# Patient Record
Sex: MALE | Race: WHITE | NOT HISPANIC OR LATINO | URBAN - METROPOLITAN AREA
[De-identification: names, ages, dates, MRNs, and addresses within clinical notes are randomized per-mention and may not be internally consistent; named-entity substitution may affect disease eponyms.]

---

## 2014-12-28 RX ORDER — DOCUSATE SODIUM 100 MG
1 CAPSULE ORAL
Qty: 0 | Refills: 0 | COMMUNITY
Start: 2014-12-28

## 2014-12-28 RX ORDER — ASPIRIN/CALCIUM CARB/MAGNESIUM 324 MG
1 TABLET ORAL
Qty: 0 | Refills: 0 | COMMUNITY
Start: 2014-12-28

## 2017-02-23 ENCOUNTER — OUTPATIENT (OUTPATIENT)
Dept: OUTPATIENT SERVICES | Facility: HOSPITAL | Age: 77
LOS: 1 days | End: 2017-02-23
Payer: MEDICARE

## 2017-02-23 VITALS
HEART RATE: 65 BPM | TEMPERATURE: 98 F | RESPIRATION RATE: 18 BRPM | OXYGEN SATURATION: 100 % | HEIGHT: 69.5 IN | WEIGHT: 175.05 LBS | DIASTOLIC BLOOD PRESSURE: 81 MMHG | SYSTOLIC BLOOD PRESSURE: 137 MMHG

## 2017-02-23 VITALS
WEIGHT: 175.27 LBS | HEART RATE: 70 BPM | HEIGHT: 69 IN | DIASTOLIC BLOOD PRESSURE: 81 MMHG | SYSTOLIC BLOOD PRESSURE: 137 MMHG | RESPIRATION RATE: 16 BRPM | OXYGEN SATURATION: 100 % | TEMPERATURE: 98 F

## 2017-02-23 DIAGNOSIS — N43.3 HYDROCELE, UNSPECIFIED: Chronic | ICD-10-CM

## 2017-02-23 DIAGNOSIS — Z01.810 ENCOUNTER FOR PREPROCEDURAL CARDIOVASCULAR EXAMINATION: ICD-10-CM

## 2017-02-23 DIAGNOSIS — H40.9 UNSPECIFIED GLAUCOMA: ICD-10-CM

## 2017-02-23 DIAGNOSIS — Z98.89 OTHER SPECIFIED POSTPROCEDURAL STATES: Chronic | ICD-10-CM

## 2017-02-23 DIAGNOSIS — I10 ESSENTIAL (PRIMARY) HYPERTENSION: ICD-10-CM

## 2017-02-23 DIAGNOSIS — K21.9 GASTRO-ESOPHAGEAL REFLUX DISEASE WITHOUT ESOPHAGITIS: ICD-10-CM

## 2017-02-23 DIAGNOSIS — E78.1 PURE HYPERGLYCERIDEMIA: ICD-10-CM

## 2017-02-23 LAB
ANION GAP SERPL CALC-SCNC: 9 MMOL/L — SIGNIFICANT CHANGE UP (ref 5–17)
BUN SERPL-MCNC: 18 MG/DL — SIGNIFICANT CHANGE UP (ref 8–20)
CALCIUM SERPL-MCNC: 9.4 MG/DL — SIGNIFICANT CHANGE UP (ref 8.6–10.2)
CHLORIDE SERPL-SCNC: 104 MMOL/L — SIGNIFICANT CHANGE UP (ref 98–107)
CO2 SERPL-SCNC: 26 MMOL/L — SIGNIFICANT CHANGE UP (ref 22–29)
CREAT SERPL-MCNC: 0.95 MG/DL — SIGNIFICANT CHANGE UP (ref 0.5–1.3)
GLUCOSE SERPL-MCNC: 78 MG/DL — SIGNIFICANT CHANGE UP (ref 70–115)
HCT VFR BLD CALC: 42.1 % — SIGNIFICANT CHANGE UP (ref 42–52)
HGB BLD-MCNC: 14.7 G/DL — SIGNIFICANT CHANGE UP (ref 14–18)
INR BLD: 1.08 RATIO — SIGNIFICANT CHANGE UP (ref 0.88–1.16)
MCHC RBC-ENTMCNC: 30.7 PG — SIGNIFICANT CHANGE UP (ref 27–31)
MCHC RBC-ENTMCNC: 34.9 G/DL — SIGNIFICANT CHANGE UP (ref 32–36)
MCV RBC AUTO: 87.9 FL — SIGNIFICANT CHANGE UP (ref 80–94)
PLATELET # BLD AUTO: 181 K/UL — SIGNIFICANT CHANGE UP (ref 150–400)
POTASSIUM SERPL-MCNC: 4.5 MMOL/L — SIGNIFICANT CHANGE UP (ref 3.5–5.3)
POTASSIUM SERPL-SCNC: 4.5 MMOL/L — SIGNIFICANT CHANGE UP (ref 3.5–5.3)
PROTHROM AB SERPL-ACNC: 11.9 SEC — SIGNIFICANT CHANGE UP (ref 10–13.1)
RBC # BLD: 4.79 M/UL — SIGNIFICANT CHANGE UP (ref 4.6–6.2)
RBC # FLD: 12.8 % — SIGNIFICANT CHANGE UP (ref 11–15.6)
SODIUM SERPL-SCNC: 139 MMOL/L — SIGNIFICANT CHANGE UP (ref 135–145)
WBC # BLD: 5.7 K/UL — SIGNIFICANT CHANGE UP (ref 4.8–10.8)
WBC # FLD AUTO: 5.7 K/UL — SIGNIFICANT CHANGE UP (ref 4.8–10.8)

## 2017-02-23 PROCEDURE — 93005 ELECTROCARDIOGRAM TRACING: CPT

## 2017-02-23 PROCEDURE — 80048 BASIC METABOLIC PNL TOTAL CA: CPT

## 2017-02-23 PROCEDURE — 93010 ELECTROCARDIOGRAM REPORT: CPT

## 2017-02-23 PROCEDURE — 36415 COLL VENOUS BLD VENIPUNCTURE: CPT

## 2017-02-23 PROCEDURE — G0463: CPT

## 2017-02-23 PROCEDURE — 85610 PROTHROMBIN TIME: CPT

## 2017-02-23 PROCEDURE — 85027 COMPLETE CBC AUTOMATED: CPT

## 2017-02-23 NOTE — H&P PST ADULT - NSANTHOSAYNRD_GEN_A_CORE
No. BISI screening performed.  STOP BANG Legend: 0-2 = LOW Risk; 3-4 = INTERMEDIATE Risk; 5-8 = HIGH Risk

## 2017-02-23 NOTE — H&P PST ADULT - HISTORY OF PRESENT ILLNESS
This 76 year old presents to pst for cardiac cath with complaints of Jaw pain, radiating to left ear and left temporal area which began about 1 month, pain ntial 9/10 now 3/10. This 76 year old presents to pst for cardiac cath with complaints of Jaw pain, radiating to left ear and left temporal area which began about 1 month, pain initial 9/10 now 3/10.

## 2017-02-23 NOTE — H&P PST ADULT - FAMILY HISTORY
Child  Still living? Yes, Estimated age: Age Unknown  Family history of bipolar disorder, Age at diagnosis: Age Unknown

## 2017-02-24 DIAGNOSIS — I25.10 ATHEROSCLEROTIC HEART DISEASE OF NATIVE CORONARY ARTERY WITHOUT ANGINA PECTORIS: ICD-10-CM

## 2017-02-24 DIAGNOSIS — Z01.810 ENCOUNTER FOR PREPROCEDURAL CARDIOVASCULAR EXAMINATION: ICD-10-CM

## 2017-02-27 ENCOUNTER — INPATIENT (INPATIENT)
Facility: HOSPITAL | Age: 77
LOS: 0 days | Discharge: HOME | DRG: 247 | End: 2017-02-28
Attending: INTERNAL MEDICINE | Admitting: INTERNAL MEDICINE
Payer: COMMERCIAL

## 2017-02-27 VITALS
SYSTOLIC BLOOD PRESSURE: 127 MMHG | TEMPERATURE: 98 F | DIASTOLIC BLOOD PRESSURE: 76 MMHG | OXYGEN SATURATION: 100 % | RESPIRATION RATE: 18 BRPM | HEART RATE: 65 BPM

## 2017-02-27 DIAGNOSIS — I25.10 ATHEROSCLEROTIC HEART DISEASE OF NATIVE CORONARY ARTERY WITHOUT ANGINA PECTORIS: ICD-10-CM

## 2017-02-27 DIAGNOSIS — Z98.89 OTHER SPECIFIED POSTPROCEDURAL STATES: Chronic | ICD-10-CM

## 2017-02-27 DIAGNOSIS — N43.3 HYDROCELE, UNSPECIFIED: Chronic | ICD-10-CM

## 2017-02-27 DIAGNOSIS — Z01.810 ENCOUNTER FOR PREPROCEDURAL CARDIOVASCULAR EXAMINATION: ICD-10-CM

## 2017-02-27 PROCEDURE — 93010 ELECTROCARDIOGRAM REPORT: CPT

## 2017-02-27 RX ORDER — LOSARTAN POTASSIUM 100 MG/1
1 TABLET, FILM COATED ORAL
Qty: 0 | Refills: 0 | COMMUNITY

## 2017-02-27 RX ORDER — LOSARTAN POTASSIUM 100 MG/1
25 TABLET, FILM COATED ORAL DAILY
Qty: 0 | Refills: 0 | Status: DISCONTINUED | OUTPATIENT
Start: 2017-02-27 | End: 2017-02-28

## 2017-02-27 RX ORDER — TICAGRELOR 90 MG/1
90 TABLET ORAL
Qty: 0 | Refills: 0 | Status: DISCONTINUED | OUTPATIENT
Start: 2017-02-27 | End: 2017-02-28

## 2017-02-27 RX ORDER — NIZATIDINE 150 MG/1
1 CAPSULE ORAL
Qty: 0 | Refills: 0 | COMMUNITY

## 2017-02-27 RX ORDER — ASPIRIN/CALCIUM CARB/MAGNESIUM 324 MG
81 TABLET ORAL DAILY
Qty: 0 | Refills: 0 | Status: DISCONTINUED | OUTPATIENT
Start: 2017-02-27 | End: 2017-02-28

## 2017-02-27 RX ORDER — DOCUSATE SODIUM 100 MG
100 CAPSULE ORAL
Qty: 0 | Refills: 0 | Status: DISCONTINUED | OUTPATIENT
Start: 2017-02-27 | End: 2017-02-28

## 2017-02-27 RX ORDER — ASPIRIN/CALCIUM CARB/MAGNESIUM 324 MG
325 TABLET ORAL ONCE
Qty: 0 | Refills: 0 | Status: COMPLETED | OUTPATIENT
Start: 2017-02-27 | End: 2017-02-27

## 2017-02-27 RX ORDER — SIMVASTATIN 20 MG/1
40 TABLET, FILM COATED ORAL AT BEDTIME
Qty: 0 | Refills: 0 | Status: DISCONTINUED | OUTPATIENT
Start: 2017-02-27 | End: 2017-02-28

## 2017-02-27 RX ORDER — FAMOTIDINE 10 MG/ML
20 INJECTION INTRAVENOUS
Qty: 0 | Refills: 0 | Status: DISCONTINUED | OUTPATIENT
Start: 2017-02-27 | End: 2017-02-28

## 2017-02-27 RX ADMIN — Medication 325 MILLIGRAM(S): at 16:17

## 2017-02-27 RX ADMIN — LOSARTAN POTASSIUM 25 MILLIGRAM(S): 100 TABLET, FILM COATED ORAL at 22:19

## 2017-02-27 RX ADMIN — SIMVASTATIN 40 MILLIGRAM(S): 20 TABLET, FILM COATED ORAL at 22:19

## 2017-02-27 RX ADMIN — FAMOTIDINE 20 MILLIGRAM(S): 10 INJECTION INTRAVENOUS at 22:19

## 2017-02-27 NOTE — PROGRESS NOTE ADULT - SUBJECTIVE AND OBJECTIVE BOX
Subjective:  76y  Male who had a left heart catheterization which showed:       LM: 90% stenosis treated with a Resolute ALMA ROSA       LAD: 80% proximal stenosis fed by an atretic LIMA with slow flow, treated with a proximal and 2 mid Resolute DS's       LCX: 90% proximal stenosis filling via a SVG       RCA: Distal occlusion filling via a SVG       LIMA to the LAD: Small caliber with slow flow       Sequential SVG to the D2 and apical LAD: Patent       SVG to the LCX: Patent       SVG to the RCA: Patent    PAST MEDICAL & SURGICAL HISTORY:  Essential hypertension  Hyperlipemia  Glaucoma  GERD (gastroesophageal reflux disease)  History of tonsillectomy  Left hydrocele  History of facial surgery  H/O shoulder surgery    FAMILY HISTORY:  Family history of bipolar disorder (Child)    MEDICATIONS  (STANDING):  aspirin enteric coated 81milliGRAM(s) Oral daily  losartan 25milliGRAM(s) Oral daily  simvastatin 40milliGRAM(s) Oral at bedtime  famotidine    Tablet 20milliGRAM(s) Oral two times a day  docusate sodium 100milliGRAM(s) Oral two times a day  ticagrelor 90milliGRAM(s) Oral two times a day      General: No fatigue, no fevers/chills  Respiratory: No dyspnea, no cough, no wheeze  CV: No chest pain, no palpitations  Abd: No nausea  Neuro: No headache, no dizziness  Maddy (Hives)      Objective:  Vital Signs Last 24 Hrs  T(C): 36.4, Max: 36.4 (02-27 @ 15:59)  T(F): 97.6, Max: 97.6 (02-27 @ 15:59)  HR: 65 (65 - 65)  BP: 127/76 (127/76 - 127/76)  RR: 18 (18 - 18)  SpO2: 100% (100% - 100%)    CM: SR  Neuro: A&OX3, CN 2-12 intact  HEENT: NC, AT  Lungs: CTA B/L  CV: S1, S2, no murmur, RRR  Abd: Soft  Right Groin: RFA sheath, no bleeding, no hematoma  Extremity: + distal pulses

## 2017-02-27 NOTE — PROGRESS NOTE ADULT - ASSESSMENT
76y Male  Procedure: Left heart catheterization and PCI with ALMA ROSA of the LM, pLAD, mLAD  Pre-op diagnosis: Unstable angina  Post-op diagnosis: CAD of native arteries of the native heart with unstable angina    1. Remove right femoral artery sheath at: 9:00PM    2. Bedrest for: 4 hours post sheath pull    3. Admit to: [address] [place]    4. Follow up as an outpatient with Dr. Sifuentes    5. DAPT: Brilinta 90mg twice daily and Aspirin 81mg daily    6. Statin: Zocor 40mg Q HS    7. CBC, BMP, Mg, EKG in AM    8. Continue current medications

## 2017-02-27 NOTE — PROGRESS NOTE ADULT - SUBJECTIVE AND OBJECTIVE BOX
PA note    Right groin sheath DC'd pressure held x 20 minutes with good hemostasis no hematoma or tenderness. VVS  through out. Sheath pull was done in stearal fashion and dressing applied.   Further groin care per routine.

## 2017-02-27 NOTE — PROGRESS NOTE ADULT - SUBJECTIVE AND OBJECTIVE BOX
Pre Cath Note    76y Male     Planned Procedure: [facility]    Pertinent Prior Medications:        Antiplatelet: N/A       Aspirin: 81mg daily       Statin: Zocor 40mg Q HS       Other: Losartan 25mg daily    Pre-Procedural Orders: Aspirin 325mg    ASA: 3  Mallampati: 3  CCS Class:     HPI: This 76 year old presents for cardiac catheterization with complaints of jaw pain, radiating to left ear and left temporal area which began about 1 month ago. The pain is similar to the pain he felt prior to his CABG.  He had a cardiac PET scan which showed a small area of mild apical to mid anterolateral ischemia as well as an old area of inferior, basal inferoseptal, and basal inferolateral wall scar with reina-infarct ischemia.          Nuclear Stress Test:        Protocol: Cardiac PET Scan       Exercised for: N/A       Symptoms: None       Stress EKG: Nondiagnostic       DTS: N/A       Nuclear Imaging: A small area of mild apical to mid anterolateral ischemia, as well as an old area of inferior, basal inferoseptal, and basal inferolateral wall scar with reina-infarct ischemia.        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

## 2017-02-28 ENCOUNTER — TRANSCRIPTION ENCOUNTER (OUTPATIENT)
Age: 77
End: 2017-02-28

## 2017-02-28 VITALS
OXYGEN SATURATION: 98 % | HEART RATE: 72 BPM | DIASTOLIC BLOOD PRESSURE: 68 MMHG | SYSTOLIC BLOOD PRESSURE: 101 MMHG | TEMPERATURE: 98 F | RESPIRATION RATE: 18 BRPM

## 2017-02-28 LAB
ANION GAP SERPL CALC-SCNC: 13 MMOL/L — SIGNIFICANT CHANGE UP (ref 5–17)
BASOPHILS # BLD AUTO: 0 K/UL — SIGNIFICANT CHANGE UP (ref 0–0.2)
BASOPHILS NFR BLD AUTO: 0.1 % — SIGNIFICANT CHANGE UP (ref 0–2)
BUN SERPL-MCNC: 18 MG/DL — SIGNIFICANT CHANGE UP (ref 8–20)
CALCIUM SERPL-MCNC: 8.8 MG/DL — SIGNIFICANT CHANGE UP (ref 8.6–10.2)
CHLORIDE SERPL-SCNC: 98 MMOL/L — SIGNIFICANT CHANGE UP (ref 98–107)
CHOLEST SERPL-MCNC: 169 MG/DL — SIGNIFICANT CHANGE UP (ref 110–199)
CO2 SERPL-SCNC: 26 MMOL/L — SIGNIFICANT CHANGE UP (ref 22–29)
CREAT SERPL-MCNC: 0.99 MG/DL — SIGNIFICANT CHANGE UP (ref 0.5–1.3)
EOSINOPHIL # BLD AUTO: 0 K/UL — SIGNIFICANT CHANGE UP (ref 0–0.5)
EOSINOPHIL NFR BLD AUTO: 0.6 % — SIGNIFICANT CHANGE UP (ref 0–5)
GLUCOSE SERPL-MCNC: 114 MG/DL — SIGNIFICANT CHANGE UP (ref 70–115)
HCT VFR BLD CALC: 44.7 % — SIGNIFICANT CHANGE UP (ref 42–52)
HDLC SERPL-MCNC: 45 MG/DL — LOW
HGB BLD-MCNC: 15.8 G/DL — SIGNIFICANT CHANGE UP (ref 14–18)
LIPID PNL WITH DIRECT LDL SERPL: 100 MG/DL — SIGNIFICANT CHANGE UP
LYMPHOCYTES # BLD AUTO: 1.6 K/UL — SIGNIFICANT CHANGE UP (ref 1–4.8)
LYMPHOCYTES # BLD AUTO: 19.8 % — LOW (ref 20–55)
MAGNESIUM SERPL-MCNC: 2.2 MG/DL — SIGNIFICANT CHANGE UP (ref 1.8–2.5)
MCHC RBC-ENTMCNC: 30.8 PG — SIGNIFICANT CHANGE UP (ref 27–31)
MCHC RBC-ENTMCNC: 35.3 G/DL — SIGNIFICANT CHANGE UP (ref 32–36)
MCV RBC AUTO: 87.1 FL — SIGNIFICANT CHANGE UP (ref 80–94)
MONOCYTES # BLD AUTO: 0.6 K/UL — SIGNIFICANT CHANGE UP (ref 0–0.8)
MONOCYTES NFR BLD AUTO: 7.2 % — SIGNIFICANT CHANGE UP (ref 3–10)
NEUTROPHILS # BLD AUTO: 5.8 K/UL — SIGNIFICANT CHANGE UP (ref 1.8–8)
NEUTROPHILS NFR BLD AUTO: 71.9 % — SIGNIFICANT CHANGE UP (ref 37–73)
PLATELET # BLD AUTO: 161 K/UL — SIGNIFICANT CHANGE UP (ref 150–400)
POTASSIUM SERPL-MCNC: 4 MMOL/L — SIGNIFICANT CHANGE UP (ref 3.5–5.3)
POTASSIUM SERPL-SCNC: 4 MMOL/L — SIGNIFICANT CHANGE UP (ref 3.5–5.3)
RBC # BLD: 5.13 M/UL — SIGNIFICANT CHANGE UP (ref 4.6–6.2)
RBC # FLD: 12.7 % — SIGNIFICANT CHANGE UP (ref 11–15.6)
SODIUM SERPL-SCNC: 137 MMOL/L — SIGNIFICANT CHANGE UP (ref 135–145)
TOTAL CHOLESTEROL/HDL RATIO MEASUREMENT: 4 RATIO — SIGNIFICANT CHANGE UP (ref 3.4–9.6)
TRIGL SERPL-MCNC: 121 MG/DL — SIGNIFICANT CHANGE UP (ref 10–200)
WBC # BLD: 8.1 K/UL — SIGNIFICANT CHANGE UP (ref 4.8–10.8)
WBC # FLD AUTO: 8.1 K/UL — SIGNIFICANT CHANGE UP (ref 4.8–10.8)

## 2017-02-28 PROCEDURE — 83735 ASSAY OF MAGNESIUM: CPT

## 2017-02-28 PROCEDURE — C1725: CPT

## 2017-02-28 PROCEDURE — C1769: CPT

## 2017-02-28 PROCEDURE — C1874: CPT

## 2017-02-28 PROCEDURE — 93455 CORONARY ART/GRFT ANGIO S&I: CPT | Mod: XU

## 2017-02-28 PROCEDURE — 36415 COLL VENOUS BLD VENIPUNCTURE: CPT

## 2017-02-28 PROCEDURE — C1887: CPT

## 2017-02-28 PROCEDURE — C1894: CPT

## 2017-02-28 PROCEDURE — C9600: CPT | Mod: LD

## 2017-02-28 PROCEDURE — 80048 BASIC METABOLIC PNL TOTAL CA: CPT

## 2017-02-28 PROCEDURE — 80061 LIPID PANEL: CPT

## 2017-02-28 PROCEDURE — 85027 COMPLETE CBC AUTOMATED: CPT

## 2017-02-28 PROCEDURE — 93010 ELECTROCARDIOGRAM REPORT: CPT

## 2017-02-28 PROCEDURE — C9601: CPT

## 2017-02-28 PROCEDURE — 93005 ELECTROCARDIOGRAM TRACING: CPT

## 2017-02-28 RX ORDER — METOPROLOL TARTRATE 50 MG
1 TABLET ORAL
Qty: 30 | Refills: 0 | OUTPATIENT
Start: 2017-02-28 | End: 2017-03-30

## 2017-02-28 RX ORDER — METOPROLOL TARTRATE 50 MG
50 TABLET ORAL DAILY
Qty: 0 | Refills: 0 | Status: DISCONTINUED | OUTPATIENT
Start: 2017-02-28 | End: 2017-02-28

## 2017-02-28 RX ORDER — TICAGRELOR 90 MG/1
1 TABLET ORAL
Qty: 180 | Refills: 3 | OUTPATIENT
Start: 2017-02-28 | End: 2018-02-22

## 2017-02-28 RX ADMIN — TICAGRELOR 90 MILLIGRAM(S): 90 TABLET ORAL at 06:04

## 2017-02-28 RX ADMIN — FAMOTIDINE 20 MILLIGRAM(S): 10 INJECTION INTRAVENOUS at 06:05

## 2017-02-28 RX ADMIN — Medication 50 MILLIGRAM(S): at 07:15

## 2017-02-28 NOTE — DISCHARGE NOTE ADULT - PLAN OF CARE
No chest pain Follow up with your doctor as instructed.  Continue medications as instructed.  Follow prescribed diet. No VT

## 2017-02-28 NOTE — DISCHARGE NOTE ADULT - MEDICATION SUMMARY - MEDICATIONS TO TAKE
I will START or STAY ON the medications listed below when I get home from the hospital:    aspirin 81 mg oral tablet  -- 1 tab(s) by mouth once a day  -- Indication: For Coronary artery disease involving native coronary artery of native heart with unstable angina pector    losartan 25 mg oral tablet  -- 1 tab(s) by mouth once a day  -- Indication: For Hypertension    simvastatin  -- 40 milligram(s) by mouth once a day (at bedtime)  -- Indication: For Hyperlipidemia    ticagrelor 90 mg oral tablet  -- 1 tab(s) by mouth 2 times a day  -- Indication: For Coronary artery disease involving native coronary artery of native heart with unstable angina pector    metoprolol succinate 50 mg oral tablet, extended release  -- 1 tab(s) by mouth once a day  -- Indication: For Hypertension    nizatidine 150 mg oral capsule  -- 1 cap(s) by mouth 2 times a day  -- Indication: For GERD (gastroesophageal reflux disease)    docusate sodium 100 mg oral capsule  -- 1 cap(s) by mouth 2 times a day  -- Indication: For Constipation    brimonidine-timolol 0.2%-0.5% ophthalmic solution  -- 1 drop(s) to each affected eye 2 times a day  -- Indication: For Glaucoma

## 2017-02-28 NOTE — PROGRESS NOTE ADULT - SUBJECTIVE AND OBJECTIVE BOX
Subjective:  76y  Male who had a left heart catheterization which showed:       LM: 90% stenosis treated with a Resolute ALMA ROSA       LAD: 80% proximal stenosis fed by an atretic LIMA with slow flow, treated with a proximal and 2 mid Resolute DS's       LCX: 90% proximal stenosis filling via a SVG       RCA: Distal occlusion filling via a SVG       LIMA to the LAD: Small caliber with slow flow       Sequential SVG to the D2 and apical LAD: Patent       SVG to the LCX: Patent       SVG to the RCA: Patent      PAST MEDICAL & SURGICAL HISTORY:  Essential hypertension  Hyperlipemia  Glaucoma  GERD (gastroesophageal reflux disease)  History of tonsillectomy  Left hydrocele  History of facial surgery  H/O shoulder surgery    FAMILY HISTORY:  Family history of bipolar disorder (Child)    MEDICATIONS  (STANDING):  aspirin enteric coated 81milliGRAM(s) Oral daily  losartan 25milliGRAM(s) Oral daily  simvastatin 40milliGRAM(s) Oral at bedtime  famotidine    Tablet 20milliGRAM(s) Oral two times a day  docusate sodium 100milliGRAM(s) Oral two times a day  ticagrelor 90milliGRAM(s) Oral two times a day  metoprolol succinate ER 50milliGRAM(s) Oral daily        General: No fatigue, no fevers/chills  Respiratory: No dyspnea, no cough, no wheeze  CV: No chest pain, no palpitations  Abd: No nausea  Neuro: No headache, no dizziness  Seldane-D (Hives)      Objective:  Vital Signs Last 24 Hrs  T(C): 36.4, Max: 36.4 (02-27 @ 15:59)  T(F): 97.6, Max: 97.6 (02-27 @ 15:59)  HR: 73 (60 - 85)  BP: 149/76 (120/72 - 158/71)  RR: 16 (16 - 18)  SpO2: 98% (94% - 100%)    CM: Atrial sensed, ventricular paced, occasional to frequent PVC's and 2 episodes of monomorphic VT (> 10 beats each)  Neuro: A&OX3, CN 2-12 intact  HEENT: NC, AT  Lungs: CTA B/L  CV: S1, S2, no murmur, RRR  Abd: Soft  Right Groin: Soft, no bleeding, no hematoma  Extremity: + distal pulses  EKG: Atrial sensed, ventricular paced                        15.8   8.1   )-----------( 161      ( 28 Feb 2017 02:21 )             44.7     28 Feb 2017 02:21    137    |  98     |  18.0   ----------------------------<  114    4.0     |  26.0   |  0.99     Ca    8.8        28 Feb 2017 02:21  Mg     2.2       28 Feb 2017 02:21

## 2017-02-28 NOTE — DISCHARGE NOTE ADULT - CARE PLAN
Principal Discharge DX:	Coronary artery disease involving native coronary artery of native heart with unstable angina pector  Goal:	No chest pain  Instructions for follow-up, activity and diet:	Follow up with your doctor as instructed.  Continue medications as instructed.  Follow prescribed diet.  Secondary Diagnosis:	Ventricular tachycardia, monomorphic  Goal:	No VT  Instructions for follow-up, activity and diet:	Follow up with your doctor as instructed.  Continue medications as instructed.  Follow prescribed diet.

## 2017-02-28 NOTE — DISCHARGE NOTE ADULT - PRINCIPAL DIAGNOSIS
Coronary artery disease involving native coronary artery of native heart with unstable angina pector

## 2017-02-28 NOTE — DISCHARGE NOTE ADULT - PATIENT PORTAL LINK FT
“You can access the FollowHealth Patient Portal, offered by [facility], by registering with the following website: [email or web address]”

## 2017-02-28 NOTE — DISCHARGE NOTE ADULT - NS AS ACTIVITY OBS
Walking-Indoors allowed/Showering allowed/Stairs allowed/Do not drive or operate machinery/Walking-Outdoors allowed/No Heavy lifting/straining

## 2017-02-28 NOTE — PROGRESS NOTE ADULT - ASSESSMENT
Procedure: Left heart catheterization and PCI with ALMA ROSA of the LM, pLAD, and mLAD  Discharge Diagnosis: CAD of native LM and LAD of native heart with unstable angina, monomorphic VT    1. Discharge home today    2. Follow up as an outpatient with: Dr. Sifuentes    3. Post procedure teaching done including importance of medication adherence and access site care and monitoring.    4. DAPT: Brilinta 90mg BID and aspirin 81mg daily    5. Statin: Zocor 40mg Q HS    6. Beta Blocker: Toprol 50mg daily    7. ACEI/ARB: Losartan 25mg daily

## 2017-02-28 NOTE — DISCHARGE NOTE ADULT - ADDITIONAL INSTRUCTIONS
No heavy lifting, driving, sex, tub baths, swimming, or any activity that submerges the lower half of the body in water for 48 hours.  Limited walking and stairs for 48 hours.    Change the bandaid after 24 hours and every 24 hours after that.  Keep the puncture site dry and covered with a bandaid until a scab forms.    Observe the site frequently.  If bleeding or a large lump (the size of a golf ball or bigger) occurs lie flat, apply continuous direct pressure just above the puncture site for at least 10 minutes, and notify your physician immediately.  If the bleeding cannot be controlled, call 911 immediately for assistance.  Notify your physician of pain, swelling or any drainage.    Notify your physician immediately if coldness, numbness, discoloration or pain in your foot occurs.  Contact your physician before discontinuing any medications.  The American College of Cardiology provides a website which provides information and tools to help manage your disease.  It can be found at https://www.cardiosmart.org

## 2020-12-24 ENCOUNTER — TRANSCRIPTION ENCOUNTER (OUTPATIENT)
Age: 80
End: 2020-12-24

## 2022-05-30 ENCOUNTER — NON-APPOINTMENT (OUTPATIENT)
Age: 82
End: 2022-05-30

## 2022-05-31 ENCOUNTER — TRANSCRIPTION ENCOUNTER (OUTPATIENT)
Age: 82
End: 2022-05-31

## 2022-05-31 ENCOUNTER — EMERGENCY (EMERGENCY)
Facility: HOSPITAL | Age: 82
LOS: 1 days | Discharge: HOME | End: 2022-05-31
Attending: EMERGENCY MEDICINE
Payer: MEDICARE

## 2022-05-31 VITALS — DIASTOLIC BLOOD PRESSURE: 60 MMHG | SYSTOLIC BLOOD PRESSURE: 110 MMHG | HEART RATE: 78 BPM

## 2022-05-31 VITALS
TEMPERATURE: 98 F | OXYGEN SATURATION: 96 % | HEART RATE: 77 BPM | SYSTOLIC BLOOD PRESSURE: 96 MMHG | DIASTOLIC BLOOD PRESSURE: 60 MMHG | WEIGHT: 164.91 LBS | RESPIRATION RATE: 18 BRPM | HEIGHT: 69.5 IN

## 2022-05-31 DIAGNOSIS — N43.3 HYDROCELE, UNSPECIFIED: Chronic | ICD-10-CM

## 2022-05-31 DIAGNOSIS — Z98.89 OTHER SPECIFIED POSTPROCEDURAL STATES: Chronic | ICD-10-CM

## 2022-05-31 PROBLEM — I10 ESSENTIAL (PRIMARY) HYPERTENSION: Chronic | Status: ACTIVE | Noted: 2017-02-23

## 2022-05-31 LAB — SARS-COV-2 RNA SPEC QL NAA+PROBE: DETECTED

## 2022-05-31 PROCEDURE — L9998: CPT

## 2022-05-31 PROCEDURE — U0005: CPT

## 2022-05-31 PROCEDURE — U0003: CPT

## 2022-05-31 PROCEDURE — 99283 EMERGENCY DEPT VISIT LOW MDM: CPT

## 2022-05-31 NOTE — ED PROVIDER NOTE - OBJECTIVE STATEMENT
pt is a 81 year old male with history of cad, HTn, HLD presenting with covid19 referral for MAB. symptoms began 2 days ago. + fevers, chills, congestion, cough. No chest pain or sob. +1 J&J vaccine. No booster. No travel or sick contacts. No abd pain n/v/d. No LE pain or swelling. unable to take paxlovid 2/2 interaction with home meds.

## 2022-05-31 NOTE — ED ADULT NURSE NOTE - OBJECTIVE STATEMENT
assumed care of pt at at 14:33. pt reports  general malaise went to Urgent Care and tested COVID+ and sent to ER for "antibody infusion". denies any compliants at all. pt educated on plan of care, pt able to successfully teach back plan of care to RN, RN will continue to reeducate pt during hospital stay.

## 2022-05-31 NOTE — ED PROVIDER NOTE - PATIENT PORTAL LINK FT
You can access the FollowMyHealth Patient Portal offered by [facility] by registering at the following website: [email or web address]. By joining [facility]’s FollowMyHealth portal, you will also be able to view your health information using other applications (apps) compatible with our system.

## 2022-05-31 NOTE — ED PROVIDER NOTE - CROS ED RESP ALL NEG
Detail Level: Detailed Quality 226: Preventive Care And Screening: Tobacco Use: Screening And Cessation Intervention: Patient screened for tobacco and is an ex-smoker Quality 431: Preventive Care And Screening: Unhealthy Alcohol Use - Screening: Patient screened for unhealthy alcohol use using a single question and scores less than 2 times per year Quality 111:Pneumonia Vaccination Status For Older Adults: Pneumococcal Vaccination Previously Received Quality 110: Preventive Care And Screening: Influenza Immunization: Influenza Immunization previously received during influenza season - - -

## 2022-05-31 NOTE — ED PROVIDER NOTE - NSICDXPASTSURGICALHX_GEN_ALL_CORE_FT
PAST SURGICAL HISTORY:  H/O shoulder surgery     History of facial surgery     History of tonsillectomy     Left hydrocele

## 2022-05-31 NOTE — ED ADULT TRIAGE NOTE - CHIEF COMPLAINT QUOTE
Pt states been feeling general malaise went to Urgent Care and tested COVID+ and sent to ER for "antibody infusion".

## 2022-05-31 NOTE — ED PROVIDER NOTE - NSICDXFAMILYHX_GEN_ALL_CORE_FT
FAMILY HISTORY:  Child  Still living? Yes, Estimated age: Age Unknown  Family history of bipolar disorder, Age at diagnosis: Age Unknown

## 2022-05-31 NOTE — ED PROVIDER NOTE - NSICDXPASTMEDICALHX_GEN_ALL_CORE_FT
PAST MEDICAL HISTORY:  Essential hypertension     GERD (gastroesophageal reflux disease)     Glaucoma     Hyperlipemia

## 2022-05-31 NOTE — ED PROVIDER NOTE - CLINICAL SUMMARY MEDICAL DECISION MAKING FREE TEXT BOX
pt with covid19. VSS. no distress. snet in for referral for MAB. referral made. stable for dc with return precautions

## 2022-06-02 ENCOUNTER — APPOINTMENT (OUTPATIENT)
Dept: DISASTER EMERGENCY | Facility: HOSPITAL | Age: 82
End: 2022-06-02

## 2022-08-10 ENCOUNTER — EMERGENCY (EMERGENCY)
Facility: HOSPITAL | Age: 82
LOS: 1 days | Discharge: HOME | End: 2022-08-10
Attending: STUDENT IN AN ORGANIZED HEALTH CARE EDUCATION/TRAINING PROGRAM
Payer: MEDICARE

## 2022-08-10 VITALS
WEIGHT: 162.04 LBS | HEIGHT: 69.5 IN | DIASTOLIC BLOOD PRESSURE: 79 MMHG | HEART RATE: 62 BPM | SYSTOLIC BLOOD PRESSURE: 119 MMHG | OXYGEN SATURATION: 100 % | TEMPERATURE: 98 F | RESPIRATION RATE: 18 BRPM

## 2022-08-10 DIAGNOSIS — Z98.89 OTHER SPECIFIED POSTPROCEDURAL STATES: Chronic | ICD-10-CM

## 2022-08-10 DIAGNOSIS — N43.3 HYDROCELE, UNSPECIFIED: Chronic | ICD-10-CM

## 2022-08-10 PROCEDURE — 99282 EMERGENCY DEPT VISIT SF MDM: CPT

## 2022-08-10 PROCEDURE — 99283 EMERGENCY DEPT VISIT LOW MDM: CPT | Mod: FS,CS

## 2022-08-10 RX ORDER — MORPHINE SULFATE 50 MG/1
4 CAPSULE, EXTENDED RELEASE ORAL ONCE
Refills: 0 | Status: COMPLETED | OUTPATIENT
Start: 2022-08-10 | End: 2022-08-10

## 2022-08-10 NOTE — ED PROVIDER NOTE - NS ED ATTENDING STATEMENT MOD
This was a shared visit with the BRADEN. I reviewed and verified the documentation and independently performed the documented:

## 2022-08-10 NOTE — ED PROVIDER NOTE - PATIENT PORTAL LINK FT
You can access the FollowMyHealth Patient Portal offered by [facility] by registering at the following website: [email or web address]. By joining [facility]’s FollowMyHealth portal, you will also be able to view your health information using other applications (apps) compatible with our system.

## 2022-08-10 NOTE — ED PROVIDER NOTE - OBJECTIVE STATEMENT
Patient is a 81 year old male who presents c/o constipation and abdominal pain.  patient has hx of constipation in past, states this episode started on Saturday, pain developed this am.  patient is passing gas. patient took enema with only leakage since. no vomiting

## 2022-08-10 NOTE — ED PROVIDER NOTE - NSFOLLOWUPINSTRUCTIONS_ED_ALL_ED_FT
follow up PMD in 48 hours  return to ER if any increase in symptoms follow up PMD in 48 hours  return to ER if any increase in symptoms      Constipation, Adult      Constipation is when a person has fewer than three bowel movements in a week, has difficulty having a bowel movement, or has stools (feces) that are dry, hard, or larger than normal. Constipation may be caused by an underlying condition. It may become worse with age if a person takes certain medicines and does not take in enough fluids.      Follow these instructions at home:      Eating and drinking      •Eat foods that have a lot of fiber, such as beans, whole grains, and fresh fruits and vegetables.      •Limit foods that are low in fiber and high in fat and processed sugars, such as fried or sweet foods. These include french fries, hamburgers, cookies, candies, and soda.      •Drink enough fluid to keep your urine pale yellow.      General instructions     •Exercise regularly or as told by your health care provider. Try to do 150 minutes of moderate exercise each week.      •Use the bathroom when you have the urge to go. Do not hold it in.      •Take over-the-counter and prescription medicines only as told by your health care provider. This includes any fiber supplements.    •During bowel movements:   •Practice deep breathing while relaxing the lower abdomen.      •Practice pelvic floor relaxation.        •Watch your condition for any changes. Let your health care provider know about them.      •Keep all follow-up visits as told by your health care provider. This is important.        Contact a health care provider if:    •You have pain that gets worse.      •You have a fever.      •You do not have a bowel movement after 4 days.      •You vomit.      •You are not hungry or you lose weight.      •You are bleeding from the opening between the buttocks (anus).      •You have thin, pencil-like stools.        Get help right away if:    •You have a fever and your symptoms suddenly get worse.      •You leak stool or have blood in your stool.      •Your abdomen is bloated.      •You have severe pain in your abdomen.      •You feel dizzy or you faint.        Summary    •Constipation is when a person has fewer than three bowel movements in a week, has difficulty having a bowel movement, or has stools (feces) that are dry, hard, or larger than normal.      •Eat foods that have a lot of fiber, such as beans, whole grains, and fresh fruits and vegetables.      •Drink enough fluid to keep your urine pale yellow.      •Take over-the-counter and prescription medicines only as told by your health care provider. This includes any fiber supplements.      This information is not intended to replace advice given to you by your health care provider. Make sure you discuss any questions you have with your health care provider.

## 2022-08-10 NOTE — ED PROVIDER NOTE - ATTENDING APP SHARED VISIT CONTRIBUTION OF CARE
82 yo male presenting with lower abdominal discomfort and difficulty going to the bathroom for the past several days. Patient states he has history of constipation and usually take medications to manage his condition. However, he discontinued using it several months ago for a couple of reasons. He also states he follows yearly with his doctor; states he is a 911 survivor and also had a CT of chest/abd/pelvis within the past month which was generally unchanged. Patient states after being disimpacted by PA Wilmar went to the bathroom and feels much better.  Gen: Alert, NAD  Head: NC, AT, EOMI, normal lids/conjunctiva  Neck: +supple, no tenderness/meningismus/JVD, +Trachea midline  Pulm: Bilateral BS, normal resp effort, no wheeze/stridor/retractions  CV: RRR, no R/G, +dist pulses  Abd: soft, NT/ND, +BS, no hepatosplenomegaly  Mskel: no edema/erythema/cyanosis  Skin: no rash  Neuro: AAOx3, no gross sensory/motor deficits  I personally saw the patient with the PA, and completed the key components of the history and physical exam. I then discussed the management plan with the PA.

## 2022-08-10 NOTE — ED ADULT TRIAGE NOTE - CHIEF COMPLAINT QUOTE
[Negative] : Endocrine
lower abd pain with no BM for 3-4 days. pt reports taking enema today with " leakage" but no BM. denies n/v

## 2022-09-01 NOTE — DISCHARGE NOTE ADULT - HOSPITAL COURSE
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 The pain is similar to the pain he felt prior to his CABG.  He had a cardiac PET scan which showed a small area of mild apical to mid anterolateral ischemia as well as an old area of inferior, basal inferoseptal, and basal inferolateral wall scar with reina-infarct ischemia.  He had a LHC which showed coronary arteries as listed below, and a PCI with ALMA ROSA's of the LM, pLAD, and mLAD.  Overnight he had some episodes of monomorphic VT (> 10 beats each episode) and was started on Toprol 50mg daily.  Pacemaker interrogation showed a lower rate at 60.
PAST MEDICAL HISTORY:  Breast cancer in left breast, surgery , radiation--in remission until recurrence 4/2022, now s/p mastectomy with reconstruction    History of 2019 novel coronavirus disease (COVID-19) 8/2021 - mild case

## 2022-12-01 ENCOUNTER — OFFICE (OUTPATIENT)
Dept: URBAN - METROPOLITAN AREA CLINIC 113 | Facility: CLINIC | Age: 82
Setting detail: OPHTHALMOLOGY
End: 2022-12-01
Payer: MEDICARE

## 2022-12-01 DIAGNOSIS — H43.811: ICD-10-CM

## 2022-12-01 DIAGNOSIS — H40.1123: ICD-10-CM

## 2022-12-01 DIAGNOSIS — Z96.1: ICD-10-CM

## 2022-12-01 DIAGNOSIS — H40.1112: ICD-10-CM

## 2022-12-01 DIAGNOSIS — H43.393: ICD-10-CM

## 2022-12-01 DIAGNOSIS — H10.45: ICD-10-CM

## 2022-12-01 DIAGNOSIS — H04.123: ICD-10-CM

## 2022-12-01 PROCEDURE — 92014 COMPRE OPH EXAM EST PT 1/>: CPT | Performed by: OPHTHALMOLOGY

## 2022-12-01 PROCEDURE — 92250 FUNDUS PHOTOGRAPHY W/I&R: CPT | Performed by: OPHTHALMOLOGY

## 2022-12-01 ASSESSMENT — REFRACTION_AUTOREFRACTION
OD_SPHERE: -0.25
OD_CYLINDER: -1.00
OD_AXIS: 099
OS_AXIS: 069
OS_SPHERE: PLANO
OS_CYLINDER: -1.75

## 2022-12-01 ASSESSMENT — KERATOMETRY
OD_AXISANGLE_DEGREES: 005
OS_K2POWER_DIOPTERS: 44.75
METHOD_AUTO_MANUAL: AUTO
OS_K1POWER_DIOPTERS: 43.75
OD_K1POWER_DIOPTERS: 44.50
OS_AXISANGLE_DEGREES: 161
OD_K2POWER_DIOPTERS: 45.00

## 2022-12-01 ASSESSMENT — SPHEQUIV_DERIVED: OD_SPHEQUIV: -0.75

## 2022-12-01 ASSESSMENT — CONFRONTATIONAL VISUAL FIELD TEST (CVF)
OD_FINDINGS: FULL
OS_FINDINGS: FULL

## 2022-12-01 ASSESSMENT — REFRACTION_MANIFEST
OD_VA2: 20/20
OD_VA1: 20/20
OD_ADD: +2.50
OS_SPHERE: PLANO
OS_VA1: 20/20
OD_SPHERE: PLANO
OS_ADD: +2.50
OS_VA2: 20/20

## 2022-12-01 ASSESSMENT — PACHYMETRY
OD_CT_UM: 606
OD_CT_CORRECTION: -4
OS_CT_CORRECTION: -3
OS_CT_UM: 589

## 2022-12-01 ASSESSMENT — VISUAL ACUITY
OS_BCVA: 20/30-1
OD_BCVA: 20/20-1

## 2022-12-01 ASSESSMENT — SUPERFICIAL PUNCTATE KERATITIS (SPK)
OD_SPK: 1+
OS_SPK: 1+

## 2022-12-01 ASSESSMENT — TONOMETRY: OD_IOP_MMHG: 14

## 2022-12-01 ASSESSMENT — AXIALLENGTH_DERIVED: OD_AL: 23.4261

## 2022-12-05 ENCOUNTER — OFFICE (OUTPATIENT)
Dept: URBAN - METROPOLITAN AREA CLINIC 103 | Facility: CLINIC | Age: 82
Setting detail: OPHTHALMOLOGY
End: 2022-12-05
Payer: MEDICARE

## 2022-12-05 DIAGNOSIS — H40.1112: ICD-10-CM

## 2022-12-05 PROCEDURE — 66030 INJECTION TREATMENT OF EYE: CPT | Performed by: OPHTHALMOLOGY

## 2022-12-05 ASSESSMENT — REFRACTION_MANIFEST
OS_VA2: 20/20
OD_SPHERE: PLANO
OS_VA1: 20/20
OD_VA2: 20/20
OS_ADD: +2.50
OS_SPHERE: PLANO
OD_ADD: +2.50
OD_VA1: 20/20

## 2022-12-05 ASSESSMENT — AXIALLENGTH_DERIVED: OD_AL: 23.4261

## 2022-12-05 ASSESSMENT — REFRACTION_AUTOREFRACTION
OS_CYLINDER: -1.75
OS_AXIS: 069
OS_SPHERE: PLANO
OD_SPHERE: -0.25
OD_CYLINDER: -1.00
OD_AXIS: 099

## 2022-12-05 ASSESSMENT — KERATOMETRY
OD_K1POWER_DIOPTERS: 44.50
OS_AXISANGLE_DEGREES: 161
OD_K2POWER_DIOPTERS: 45.00
OD_AXISANGLE_DEGREES: 005
OS_K2POWER_DIOPTERS: 44.75
OS_K1POWER_DIOPTERS: 43.75
METHOD_AUTO_MANUAL: AUTO

## 2022-12-05 ASSESSMENT — SUPERFICIAL PUNCTATE KERATITIS (SPK)
OD_SPK: 1+
OS_SPK: 1+

## 2022-12-05 ASSESSMENT — SPHEQUIV_DERIVED: OD_SPHEQUIV: -0.75

## 2022-12-05 ASSESSMENT — VISUAL ACUITY
OS_BCVA: 20/30-1
OD_BCVA: 20/20-1

## 2022-12-05 ASSESSMENT — CONFRONTATIONAL VISUAL FIELD TEST (CVF)
OS_FINDINGS: FULL
OD_FINDINGS: FULL

## 2022-12-15 ENCOUNTER — OFFICE (OUTPATIENT)
Dept: URBAN - METROPOLITAN AREA CLINIC 113 | Facility: CLINIC | Age: 82
Setting detail: OPHTHALMOLOGY
End: 2022-12-15
Payer: MEDICARE

## 2022-12-15 DIAGNOSIS — H40.1123: ICD-10-CM

## 2022-12-15 PROCEDURE — 66030 INJECTION TREATMENT OF EYE: CPT | Performed by: OPHTHALMOLOGY

## 2022-12-15 ASSESSMENT — REFRACTION_MANIFEST
OD_VA1: 20/20
OS_VA2: 20/20
OD_SPHERE: PLANO
OS_ADD: +2.50
OS_VA1: 20/20
OD_ADD: +2.50
OD_VA2: 20/20
OS_SPHERE: PLANO

## 2022-12-15 ASSESSMENT — KERATOMETRY
OS_K1POWER_DIOPTERS: 44.00
OD_K1POWER_DIOPTERS: 44.25
OS_K2POWER_DIOPTERS: 45.00
OD_K2POWER_DIOPTERS: 44.75
METHOD_AUTO_MANUAL: AUTO
OS_AXISANGLE_DEGREES: 153
OD_AXISANGLE_DEGREES: 001

## 2022-12-15 ASSESSMENT — REFRACTION_AUTOREFRACTION
OS_CYLINDER: -1.25
OD_AXIS: 095
OD_CYLINDER: -1.50
OD_SPHERE: +0.50
OS_AXIS: 074
OS_SPHERE: +0.25

## 2022-12-15 ASSESSMENT — SPHEQUIV_DERIVED
OD_SPHEQUIV: -0.25
OS_SPHEQUIV: -0.375

## 2022-12-15 ASSESSMENT — SUPERFICIAL PUNCTATE KERATITIS (SPK)
OD_SPK: 1+
OS_SPK: 1+

## 2022-12-15 ASSESSMENT — CONFRONTATIONAL VISUAL FIELD TEST (CVF)
OS_FINDINGS: FULL
OD_FINDINGS: FULL

## 2022-12-15 ASSESSMENT — VISUAL ACUITY
OS_BCVA: 20/30-1
OD_BCVA: 20/25

## 2022-12-15 ASSESSMENT — AXIALLENGTH_DERIVED
OS_AL: 23.3728
OD_AL: 23.325

## 2023-01-19 ENCOUNTER — OFFICE (OUTPATIENT)
Dept: URBAN - METROPOLITAN AREA CLINIC 113 | Facility: CLINIC | Age: 83
Setting detail: OPHTHALMOLOGY
End: 2023-01-19
Payer: MEDICARE

## 2023-01-19 DIAGNOSIS — H40.1123: ICD-10-CM

## 2023-01-19 DIAGNOSIS — H40.1112: ICD-10-CM

## 2023-01-19 PROCEDURE — 92012 INTRM OPH EXAM EST PATIENT: CPT | Performed by: OPHTHALMOLOGY

## 2023-01-19 ASSESSMENT — KERATOMETRY
OD_K2POWER_DIOPTERS: 44.50
OD_AXISANGLE_DEGREES: 012
OS_AXISANGLE_DEGREES: 154
OD_K1POWER_DIOPTERS: 44.00
METHOD_AUTO_MANUAL: AUTO
OS_K1POWER_DIOPTERS: 43.75
OS_K2POWER_DIOPTERS: 44.50

## 2023-01-19 ASSESSMENT — PACHYMETRY
OS_CT_UM: 589
OS_CT_CORRECTION: -3
OD_CT_CORRECTION: -4
OD_CT_UM: 606

## 2023-01-19 ASSESSMENT — SUPERFICIAL PUNCTATE KERATITIS (SPK)
OS_SPK: 1+
OD_SPK: 1+

## 2023-01-19 ASSESSMENT — VISUAL ACUITY
OD_BCVA: 20/30+1
OS_BCVA: 20/25

## 2023-01-19 ASSESSMENT — REFRACTION_MANIFEST
OS_VA1: 20/20
OD_SPHERE: PLANO
OS_VA2: 20/20
OD_ADD: +2.50
OD_VA1: 20/20
OD_VA2: 20/20
OS_ADD: +2.50
OS_SPHERE: PLANO

## 2023-01-19 ASSESSMENT — REFRACTION_AUTOREFRACTION
OS_SPHERE: PLANO
OD_CYLINDER: -1.50
OD_AXIS: 095
OS_AXIS: 070
OD_SPHERE: +0.25
OS_CYLINDER: -1.50

## 2023-01-19 ASSESSMENT — CONFRONTATIONAL VISUAL FIELD TEST (CVF)
OS_FINDINGS: FULL
OD_FINDINGS: FULL

## 2023-01-19 ASSESSMENT — SPHEQUIV_DERIVED: OD_SPHEQUIV: -0.5

## 2023-01-19 ASSESSMENT — TONOMETRY: OD_IOP_MMHG: 11

## 2023-01-19 ASSESSMENT — AXIALLENGTH_DERIVED: OD_AL: 23.5115

## 2023-03-09 ENCOUNTER — OFFICE (OUTPATIENT)
Dept: URBAN - METROPOLITAN AREA CLINIC 113 | Facility: CLINIC | Age: 83
Setting detail: OPHTHALMOLOGY
End: 2023-03-09
Payer: MEDICARE

## 2023-03-09 DIAGNOSIS — H40.1123: ICD-10-CM

## 2023-03-09 DIAGNOSIS — H04.123: ICD-10-CM

## 2023-03-09 DIAGNOSIS — H43.811: ICD-10-CM

## 2023-03-09 DIAGNOSIS — H43.393: ICD-10-CM

## 2023-03-09 DIAGNOSIS — H40.1112: ICD-10-CM

## 2023-03-09 PROCEDURE — 92133 CPTRZD OPH DX IMG PST SGM ON: CPT | Performed by: OPHTHALMOLOGY

## 2023-03-09 PROCEDURE — 92014 COMPRE OPH EXAM EST PT 1/>: CPT | Performed by: OPHTHALMOLOGY

## 2023-03-09 ASSESSMENT — AXIALLENGTH_DERIVED
OD_AL: 23.4632
OS_AL: 23.5115

## 2023-03-09 ASSESSMENT — REFRACTION_AUTOREFRACTION
OS_SPHERE: +0.25
OD_CYLINDER: -1.25
OS_CYLINDER: -1.50
OS_AXIS: 068
OD_SPHERE: +0.25
OD_AXIS: 100

## 2023-03-09 ASSESSMENT — KERATOMETRY
OD_K1POWER_DIOPTERS: 44.00
OS_K1POWER_DIOPTERS: 43.75
OD_AXISANGLE_DEGREES: 019
OD_K2POWER_DIOPTERS: 44.50
OS_AXISANGLE_DEGREES: 156
OS_K2POWER_DIOPTERS: 44.75
METHOD_AUTO_MANUAL: AUTO

## 2023-03-09 ASSESSMENT — REFRACTION_MANIFEST
OD_ADD: +2.50
OD_VA1: 20/20
OS_VA2: 20/20
OD_SPHERE: PLANO
OD_VA2: 20/20
OS_VA1: 20/20
OS_ADD: +2.50
OS_SPHERE: PLANO

## 2023-03-09 ASSESSMENT — VISUAL ACUITY
OD_BCVA: 20/25+1
OS_BCVA: 20/30-1

## 2023-03-09 ASSESSMENT — SPHEQUIV_DERIVED
OS_SPHEQUIV: -0.5
OD_SPHEQUIV: -0.375

## 2023-03-09 ASSESSMENT — PACHYMETRY
OD_CT_CORRECTION: -4
OD_CT_UM: 606
OS_CT_UM: 589
OS_CT_CORRECTION: -3

## 2023-03-09 ASSESSMENT — CONFRONTATIONAL VISUAL FIELD TEST (CVF)
OD_FINDINGS: FULL
OS_FINDINGS: FULL

## 2023-03-09 ASSESSMENT — TONOMETRY: OD_IOP_MMHG: 13

## 2023-03-09 ASSESSMENT — SUPERFICIAL PUNCTATE KERATITIS (SPK)
OS_SPK: 1+
OD_SPK: 1+

## 2023-06-08 ENCOUNTER — OFFICE (OUTPATIENT)
Dept: URBAN - METROPOLITAN AREA CLINIC 113 | Facility: CLINIC | Age: 83
Setting detail: OPHTHALMOLOGY
End: 2023-06-08
Payer: MEDICARE

## 2023-06-08 DIAGNOSIS — H04.123: ICD-10-CM

## 2023-06-08 DIAGNOSIS — H43.393: ICD-10-CM

## 2023-06-08 DIAGNOSIS — H43.811: ICD-10-CM

## 2023-06-08 DIAGNOSIS — H40.1123: ICD-10-CM

## 2023-06-08 DIAGNOSIS — H40.1112: ICD-10-CM

## 2023-06-08 PROCEDURE — 92083 EXTENDED VISUAL FIELD XM: CPT | Performed by: OPHTHALMOLOGY

## 2023-06-08 PROCEDURE — 92014 COMPRE OPH EXAM EST PT 1/>: CPT | Performed by: OPHTHALMOLOGY

## 2023-06-08 ASSESSMENT — CONFRONTATIONAL VISUAL FIELD TEST (CVF)
OD_FINDINGS: FULL
OS_FINDINGS: FULL

## 2023-06-08 ASSESSMENT — REFRACTION_AUTOREFRACTION
OS_CYLINDER: -1.25
OS_SPHERE: -0.25
OS_AXIS: 067
OD_CYLINDER: -1.25
OD_AXIS: 100
OD_SPHERE: +0.50

## 2023-06-08 ASSESSMENT — PACHYMETRY
OD_CT_UM: 606
OD_CT_CORRECTION: -4
OS_CT_UM: 589
OS_CT_CORRECTION: -3

## 2023-06-08 ASSESSMENT — AXIALLENGTH_DERIVED
OS_AL: 23.6575
OD_AL: 23.4124

## 2023-06-08 ASSESSMENT — KERATOMETRY
OS_K1POWER_DIOPTERS: 44.00
OD_K1POWER_DIOPTERS: 43.75
OD_K2POWER_DIOPTERS: 44.50
OS_K2POWER_DIOPTERS: 44.50
OD_AXISANGLE_DEGREES: 016
OS_AXISANGLE_DEGREES: 146
METHOD_AUTO_MANUAL: AUTO

## 2023-06-08 ASSESSMENT — REFRACTION_MANIFEST
OS_VA1: 20/20
OS_VA2: 20/20
OS_SPHERE: PLANO
OD_VA2: 20/20
OD_VA1: 20/20
OD_ADD: +2.50
OD_SPHERE: PLANO
OS_ADD: +2.50

## 2023-06-08 ASSESSMENT — SPHEQUIV_DERIVED
OD_SPHEQUIV: -0.125
OS_SPHEQUIV: -0.875

## 2023-06-08 ASSESSMENT — SUPERFICIAL PUNCTATE KERATITIS (SPK)
OD_SPK: 1+
OS_SPK: 1+

## 2023-06-08 ASSESSMENT — VISUAL ACUITY
OD_BCVA: 20/25+1
OS_BCVA: 20/30-1

## 2023-06-08 ASSESSMENT — TONOMETRY: OD_IOP_MMHG: 15

## 2023-10-12 ENCOUNTER — OFFICE (OUTPATIENT)
Dept: URBAN - METROPOLITAN AREA CLINIC 113 | Facility: CLINIC | Age: 83
Setting detail: OPHTHALMOLOGY
End: 2023-10-12
Payer: MEDICARE

## 2023-10-12 DIAGNOSIS — H43.811: ICD-10-CM

## 2023-10-12 DIAGNOSIS — H40.1112: ICD-10-CM

## 2023-10-12 DIAGNOSIS — H40.1123: ICD-10-CM

## 2023-10-12 DIAGNOSIS — H04.123: ICD-10-CM

## 2023-10-12 DIAGNOSIS — H43.393: ICD-10-CM

## 2023-10-12 PROCEDURE — 92012 INTRM OPH EXAM EST PATIENT: CPT | Performed by: OPHTHALMOLOGY

## 2023-10-12 PROCEDURE — 92250 FUNDUS PHOTOGRAPHY W/I&R: CPT | Performed by: OPHTHALMOLOGY

## 2023-10-12 PROCEDURE — 92020 GONIOSCOPY: CPT | Performed by: OPHTHALMOLOGY

## 2023-10-12 ASSESSMENT — REFRACTION_AUTOREFRACTION
OD_SPHERE: +0.25
OS_SPHERE: +0.25
OD_CYLINDER: -1.25
OS_AXIS: 065
OS_CYLINDER: -1.50
OD_AXIS: 098

## 2023-10-12 ASSESSMENT — VISUAL ACUITY
OS_BCVA: 20/40
OD_BCVA: 20/20-1

## 2023-10-12 ASSESSMENT — SPHEQUIV_DERIVED
OD_SPHEQUIV: -0.375
OS_SPHEQUIV: -0.5

## 2023-10-12 ASSESSMENT — TONOMETRY: OD_IOP_MMHG: 14

## 2023-10-12 ASSESSMENT — CONFRONTATIONAL VISUAL FIELD TEST (CVF)
OD_FINDINGS: FULL
OS_FINDINGS: FULL

## 2023-10-12 ASSESSMENT — REFRACTION_MANIFEST
OS_SPHERE: PLANO
OS_ADD: +2.50
OS_VA2: 20/20
OD_VA2: 20/20
OD_SPHERE: PLANO
OD_VA1: 20/20
OS_VA1: 20/20
OD_ADD: +2.50

## 2023-10-12 ASSESSMENT — PACHYMETRY
OD_CT_CORRECTION: -4
OS_CT_CORRECTION: -3
OS_CT_UM: 589
OD_CT_UM: 606

## 2023-10-12 ASSESSMENT — KERATOMETRY
OS_K2POWER_DIOPTERS: 44.50
OD_AXISANGLE_DEGREES: 014
OD_K1POWER_DIOPTERS: 43.75
OS_K1POWER_DIOPTERS: 43.50
OD_K2POWER_DIOPTERS: 44.50
OS_AXISANGLE_DEGREES: 146
METHOD_AUTO_MANUAL: AUTO

## 2023-10-12 ASSESSMENT — SUPERFICIAL PUNCTATE KERATITIS (SPK)
OD_SPK: 1+
OS_SPK: 1+

## 2023-10-12 ASSESSMENT — AXIALLENGTH_DERIVED
OD_AL: 23.5087
OS_AL: 23.6031

## 2024-03-21 ENCOUNTER — OFFICE (OUTPATIENT)
Dept: URBAN - METROPOLITAN AREA CLINIC 113 | Facility: CLINIC | Age: 84
Setting detail: OPHTHALMOLOGY
End: 2024-03-21
Payer: MEDICARE

## 2024-03-21 DIAGNOSIS — H43.811: ICD-10-CM

## 2024-03-21 DIAGNOSIS — H40.1112: ICD-10-CM

## 2024-03-21 DIAGNOSIS — H40.1123: ICD-10-CM

## 2024-03-21 DIAGNOSIS — H43.393: ICD-10-CM

## 2024-03-21 DIAGNOSIS — H04.123: ICD-10-CM

## 2024-03-21 PROCEDURE — 92014 COMPRE OPH EXAM EST PT 1/>: CPT | Performed by: OPHTHALMOLOGY

## 2024-03-21 PROCEDURE — 92133 CPTRZD OPH DX IMG PST SGM ON: CPT | Performed by: OPHTHALMOLOGY

## 2024-03-21 ASSESSMENT — REFRACTION_MANIFEST
OS_VA1: 20/20
OD_VA1: 20/20
OD_VA2: 20/20
OD_ADD: +2.50
OS_ADD: +2.50
OS_SPHERE: PLANO
OD_SPHERE: PLANO
OS_VA2: 20/20

## 2024-07-11 ENCOUNTER — OFFICE (OUTPATIENT)
Dept: URBAN - METROPOLITAN AREA CLINIC 113 | Facility: CLINIC | Age: 84
Setting detail: OPHTHALMOLOGY
End: 2024-07-11
Payer: MEDICARE

## 2024-07-11 DIAGNOSIS — H04.123: ICD-10-CM

## 2024-07-11 DIAGNOSIS — H43.393: ICD-10-CM

## 2024-07-11 DIAGNOSIS — H40.1123: ICD-10-CM

## 2024-07-11 DIAGNOSIS — H43.811: ICD-10-CM

## 2024-07-11 DIAGNOSIS — H40.1112: ICD-10-CM

## 2024-07-11 PROCEDURE — 92012 INTRM OPH EXAM EST PATIENT: CPT | Performed by: OPHTHALMOLOGY

## 2024-07-11 PROCEDURE — 92083 EXTENDED VISUAL FIELD XM: CPT | Performed by: OPHTHALMOLOGY

## 2024-07-11 ASSESSMENT — CONFRONTATIONAL VISUAL FIELD TEST (CVF)
OD_FINDINGS: FULL
OS_FINDINGS: FULL

## 2024-08-01 ENCOUNTER — OFFICE (OUTPATIENT)
Dept: URBAN - METROPOLITAN AREA CLINIC 113 | Facility: CLINIC | Age: 84
Setting detail: OPHTHALMOLOGY
End: 2024-08-01
Payer: MEDICARE

## 2024-08-01 ENCOUNTER — RX ONLY (RX ONLY)
Age: 84
End: 2024-08-01

## 2024-08-01 DIAGNOSIS — H40.1112: ICD-10-CM

## 2024-08-01 PROCEDURE — 66030 INJECTION TREATMENT OF EYE: CPT | Mod: RT | Performed by: OPHTHALMOLOGY

## 2024-08-01 ASSESSMENT — CONFRONTATIONAL VISUAL FIELD TEST (CVF)
OS_FINDINGS: FULL
OD_FINDINGS: FULL

## 2024-08-08 ENCOUNTER — OFFICE (OUTPATIENT)
Dept: URBAN - METROPOLITAN AREA CLINIC 113 | Facility: CLINIC | Age: 84
Setting detail: OPHTHALMOLOGY
End: 2024-08-08
Payer: MEDICARE

## 2024-08-08 DIAGNOSIS — H40.1123: ICD-10-CM

## 2024-08-08 PROCEDURE — 66030 INJECTION TREATMENT OF EYE: CPT | Mod: LT | Performed by: OPHTHALMOLOGY

## 2024-08-08 ASSESSMENT — CONFRONTATIONAL VISUAL FIELD TEST (CVF)
OS_FINDINGS: FULL
OD_FINDINGS: FULL

## 2024-08-29 NOTE — ED PROVIDER NOTE - CHPI ED SYMPTOMS NEG
Conjuntivae and eyelids appear normal, Sclerae : White without injection
no abdominal distension/no blood in stool/no diarrhea/no dysuria/no fever/no hematuria/no nausea/no vomiting/no burning urination/no chills

## 2024-09-05 ENCOUNTER — OFFICE (OUTPATIENT)
Dept: URBAN - METROPOLITAN AREA CLINIC 113 | Facility: CLINIC | Age: 84
Setting detail: OPHTHALMOLOGY
End: 2024-09-05
Payer: MEDICARE

## 2024-09-05 DIAGNOSIS — H40.1123: ICD-10-CM

## 2024-09-05 DIAGNOSIS — H40.1112: ICD-10-CM

## 2024-09-05 PROCEDURE — 92012 INTRM OPH EXAM EST PATIENT: CPT | Performed by: OPHTHALMOLOGY

## 2024-09-05 ASSESSMENT — CONFRONTATIONAL VISUAL FIELD TEST (CVF)
OD_FINDINGS: FULL
OS_FINDINGS: FULL

## 2024-12-19 ENCOUNTER — OFFICE (OUTPATIENT)
Dept: URBAN - METROPOLITAN AREA CLINIC 113 | Facility: CLINIC | Age: 84
Setting detail: OPHTHALMOLOGY
End: 2024-12-19
Payer: MEDICARE

## 2024-12-19 DIAGNOSIS — H40.1123: ICD-10-CM

## 2024-12-19 DIAGNOSIS — H40.1112: ICD-10-CM

## 2024-12-19 PROCEDURE — 92014 COMPRE OPH EXAM EST PT 1/>: CPT | Performed by: OPHTHALMOLOGY

## 2024-12-19 PROCEDURE — 92020 GONIOSCOPY: CPT | Performed by: OPHTHALMOLOGY

## 2024-12-19 PROCEDURE — 92250 FUNDUS PHOTOGRAPHY W/I&R: CPT | Performed by: OPHTHALMOLOGY

## 2024-12-19 ASSESSMENT — CONFRONTATIONAL VISUAL FIELD TEST (CVF)
OD_FINDINGS: FULL
OS_FINDINGS: FULL

## 2024-12-19 ASSESSMENT — REFRACTION_AUTOREFRACTION
OS_AXIS: 072
OD_SPHERE: +0.75
OD_CYLINDER: -1.50
OD_AXIS: 098
OS_CYLINDER: -1.25
OS_SPHERE: +0.25

## 2024-12-19 ASSESSMENT — KERATOMETRY
OS_K2POWER_DIOPTERS: 45.00
OD_K2POWER_DIOPTERS: 44.50
OS_K1POWER_DIOPTERS: 44.00
OD_K1POWER_DIOPTERS: 43.75
METHOD_AUTO_MANUAL: AUTO
OS_AXISANGLE_DEGREES: 147
OD_AXISANGLE_DEGREES: 001

## 2024-12-19 ASSESSMENT — REFRACTION_MANIFEST
OS_ADD: +2.50
OD_ADD: +2.50
OD_VA2: 20/20
OS_SPHERE: PLANO
OS_VA2: 20/20
OD_VA1: 20/20
OD_SPHERE: PLANO
OS_VA1: 20/20

## 2024-12-19 ASSESSMENT — VISUAL ACUITY
OS_BCVA: 20/40-1
OD_BCVA: 20/20-1

## 2024-12-19 ASSESSMENT — PACHYMETRY
OS_CT_CORRECTION: -3
OS_CT_UM: 589
OD_CT_UM: 606
OD_CT_CORRECTION: -4

## 2024-12-19 ASSESSMENT — SUPERFICIAL PUNCTATE KERATITIS (SPK)
OS_SPK: 1+
OD_SPK: 1+

## 2025-04-03 ENCOUNTER — OFFICE (OUTPATIENT)
Dept: URBAN - METROPOLITAN AREA CLINIC 113 | Facility: CLINIC | Age: 85
Setting detail: OPHTHALMOLOGY
End: 2025-04-03
Payer: MEDICARE

## 2025-04-03 DIAGNOSIS — H16.223: ICD-10-CM

## 2025-04-03 DIAGNOSIS — H40.1123: ICD-10-CM

## 2025-04-03 DIAGNOSIS — H40.1112: ICD-10-CM

## 2025-04-03 PROCEDURE — 92133 CPTRZD OPH DX IMG PST SGM ON: CPT | Performed by: OPHTHALMOLOGY

## 2025-04-03 PROCEDURE — 92012 INTRM OPH EXAM EST PATIENT: CPT | Performed by: OPHTHALMOLOGY

## 2025-04-03 ASSESSMENT — SUPERFICIAL PUNCTATE KERATITIS (SPK)
OD_SPK: 1+
OS_SPK: 1+

## 2025-04-03 ASSESSMENT — REFRACTION_MANIFEST
OS_SPHERE: PLANO
OS_VA1: 20/20
OD_SPHERE: PLANO
OD_ADD: +2.50
OD_VA1: 20/20
OS_ADD: +2.50
OD_VA2: 20/20
OS_VA2: 20/20

## 2025-04-03 ASSESSMENT — PACHYMETRY
OS_CT_CORRECTION: -3
OD_CT_CORRECTION: -4
OD_CT_UM: 606
OS_CT_UM: 589

## 2025-04-03 ASSESSMENT — REFRACTION_AUTOREFRACTION
OD_AXIS: 107
OS_SPHERE: PLANO
OD_CYLINDER: -1.25
OD_SPHERE: +0.75
OS_CYLINDER: -1.00
OS_AXIS: 062

## 2025-04-03 ASSESSMENT — KERATOMETRY
OD_K2POWER_DIOPTERS: 44.75
OS_K2POWER_DIOPTERS: 45.00
OD_AXISANGLE_DEGREES: 018
OS_K1POWER_DIOPTERS: 44.00
OS_AXISANGLE_DEGREES: 145
METHOD_AUTO_MANUAL: AUTO
OD_K1POWER_DIOPTERS: 43.75

## 2025-04-03 ASSESSMENT — TONOMETRY
OD_IOP_MMHG: 12
OD_IOP_MMHG: 13

## 2025-04-03 ASSESSMENT — CONFRONTATIONAL VISUAL FIELD TEST (CVF)
OD_FINDINGS: FULL
OS_FINDINGS: FULL

## 2025-04-03 ASSESSMENT — VISUAL ACUITY
OS_BCVA: 20/50
OD_BCVA: 20/25-1